# Patient Record
Sex: FEMALE | Race: BLACK OR AFRICAN AMERICAN | HISPANIC OR LATINO | URBAN - METROPOLITAN AREA
[De-identification: names, ages, dates, MRNs, and addresses within clinical notes are randomized per-mention and may not be internally consistent; named-entity substitution may affect disease eponyms.]

---

## 2024-10-08 ENCOUNTER — OFFICE VISIT (OUTPATIENT)
Dept: URBAN - METROPOLITAN AREA CLINIC 23 | Facility: CLINIC | Age: 54
End: 2024-10-08
Payer: COMMERCIAL

## 2024-10-08 ENCOUNTER — DASHBOARD ENCOUNTERS (OUTPATIENT)
Age: 54
End: 2024-10-08

## 2024-10-08 VITALS
HEIGHT: 67 IN | BODY MASS INDEX: 20.09 KG/M2 | DIASTOLIC BLOOD PRESSURE: 72 MMHG | SYSTOLIC BLOOD PRESSURE: 108 MMHG | WEIGHT: 128 LBS | HEART RATE: 82 BPM | TEMPERATURE: 98.3 F

## 2024-10-08 DIAGNOSIS — M30.1: ICD-10-CM

## 2024-10-08 DIAGNOSIS — R47.02 DYSPHASIA: ICD-10-CM

## 2024-10-08 DIAGNOSIS — D72.18 EOSINOPHILIA IN DISEASES CLASSIFIED ELSEWHERE: ICD-10-CM

## 2024-10-08 PROBLEM — 235595009: Status: ACTIVE | Noted: 2024-10-08

## 2024-10-08 PROCEDURE — 99204 OFFICE O/P NEW MOD 45 MIN: CPT | Performed by: INTERNAL MEDICINE

## 2024-10-08 RX ORDER — FLUTICASONE PROPIONATE AND SALMETEROL XINAFOATE 230; 21 UG/1; UG/1
INHALE 2 PUFFS BY INHALATION ROUTE 2 TIMES PER DAY IN THE MORNING AND EVENING AEROSOL, METERED RESPIRATORY (INHALATION) 2
Qty: 1 | Refills: 0 | Status: ACTIVE | COMMUNITY

## 2024-10-08 RX ORDER — UBIDECARENONE 30 MG
CAPSULE ORAL
Qty: 0 | Refills: 0 | Status: ACTIVE | COMMUNITY

## 2024-10-08 RX ORDER — DUPILUMAB 300 MG/2ML
INJECTION, SOLUTION SUBCUTANEOUS
Qty: 0 | Refills: 0 | Status: ACTIVE | COMMUNITY

## 2024-10-08 NOTE — HPI-TODAY'S VISIT:
54-year-old female presents today for second opinion for chronic symptoms of dysphagia. She was seen by [facility] had extensive workup including EGD colonoscopy in May 2024 her upper endoscopy showed Chan's esophagus but biopsy no eosinophilic esophagitis she had dilation with balloon to 20 mm she is here for follow-up concerned about eosinophilic esophagitis the biopsy was from the proximal and distal esophagus no evidence of increased eosinophils.  She had history of eosinophilic granulomatosis with polyangiitis diagnosed at [place] she is on Dupixent for that reason  - PMHx: Eosinophilic granulomatosis with polyangiitis, pulmonary issue producing eosinophils  - Reports: Food getting stuck in esophagus Symptoms improved post-endoscopy, but not 100%  - Medications: DUPIXENT injection [id number]  - No skin rashes  - Neuropathy in hands and legs  Esophageal biopsy: Minimal inflammation, no eosinophils Colonoscopy: Normal   - Recent EGD findings: No strictures or narrowing 18mm dilator passed without difficulty

## 2024-10-10 ENCOUNTER — TELEPHONE ENCOUNTER (OUTPATIENT)
Dept: URBAN - METROPOLITAN AREA CLINIC 23 | Facility: CLINIC | Age: 54
End: 2024-10-10

## 2024-10-10 PROBLEM — 386789004: Status: ACTIVE | Noted: 2024-10-10

## 2024-10-10 PROBLEM — 82275008: Status: ACTIVE | Noted: 2024-10-10
